# Patient Record
Sex: FEMALE | Race: WHITE
[De-identification: names, ages, dates, MRNs, and addresses within clinical notes are randomized per-mention and may not be internally consistent; named-entity substitution may affect disease eponyms.]

---

## 2018-10-31 ENCOUNTER — HOSPITAL ENCOUNTER (INPATIENT)
Dept: HOSPITAL 31 - C.ER | Age: 28
LOS: 2 days | Discharge: HOME | DRG: 343 | End: 2018-11-02
Attending: SURGERY | Admitting: SURGERY
Payer: COMMERCIAL

## 2018-10-31 VITALS — BODY MASS INDEX: 21.7 KG/M2

## 2018-10-31 DIAGNOSIS — K35.890: Primary | ICD-10-CM

## 2018-10-31 LAB
ALBUMIN SERPL-MCNC: 4.9 [, G/DL] (ref 3.5–5)
ALBUMIN/GLOB SERPL: 1.2 [,] (ref 1–2.1)
ALT SERPL-CCNC: 19 [, U/L] (ref 9–52)
AST SERPL-CCNC: 30 [, U/L] (ref 14–36)
BACTERIA #/AREA URNS HPF: (no result) [,]
BASOPHILS # BLD AUTO: 0.1 [, K/UL] (ref 0–0.2)
BASOPHILS NFR BLD: 0.4 [, %] (ref 0–2)
BASOPHILS NFR BLD: 1 [, %] (ref 0–2)
BILIRUB UR-MCNC: NEGATIVE [,]
BUN SERPL-MCNC: 18 [, MG/DL] (ref 7–17)
CALCIUM SERPL-MCNC: 9.6 [, MG/DL] (ref 8.6–10.4)
EOSINOPHIL # BLD AUTO: 0 [, K/UL] (ref 0–0.7)
EOSINOPHIL NFR BLD: 0 [, %] (ref 0–4)
ERYTHROCYTE [DISTWIDTH] IN BLOOD BY AUTOMATED COUNT: 13.2 [, %] (ref 11.5–14.5)
GFR NON-AFRICAN AMERICAN: > 60 [,]
GLUCOSE UR STRIP-MCNC: NORMAL [, MG/DL]
HCG,QUALITATIVE URINE: NEGATIVE [,]
HGB BLD-MCNC: 14.3 [, G/DL] (ref 11–16)
LEUKOCYTE ESTERASE UR-ACNC: (no result) [, LEU/UL]
LIPASE: 36 [, U/L] (ref 23–300)
LYMPHOCYTE: 4 [, %] (ref 20–40)
LYMPHOCYTES # BLD AUTO: 1 [, K/UL] (ref 1–4.3)
LYMPHOCYTES NFR BLD AUTO: 5 [, %] (ref 20–40)
MCH RBC QN AUTO: 28.3 [, PG] (ref 27–31)
MCHC RBC AUTO-ENTMCNC: 33.4 [, G/DL] (ref 33–37)
MCV RBC AUTO: 84.6 [, FL] (ref 81–99)
MONOCYTE: 1 [, %] (ref 0–10)
MONOCYTES # BLD: 0.3 [, K/UL] (ref 0–0.8)
MONOCYTES NFR BLD: 1.5 [, %] (ref 0–10)
MYELOCYTES NFR BLD: 1 [, %] (ref 0–0)
NEUTROPHILS # BLD: 19.4 [, K/UL] (ref 1.8–7)
NEUTROPHILS NFR BLD AUTO: 92 [, %] (ref 50–75)
NEUTROPHILS NFR BLD AUTO: 93.1 [, %] (ref 50–75)
NEUTS BAND NFR BLD: 1 [, %] (ref 0–2)
NRBC BLD AUTO-RTO: 0 [, %] (ref 0–2)
PH UR STRIP: 9 [,] (ref 5–8)
PLATELET # BLD EST: NORMAL [,]
PLATELET # BLD: 269 [, K/UL] (ref 130–400)
PMV BLD AUTO: 9.9 [, FL] (ref 7.2–11.7)
PROT UR STRIP-MCNC: (no result) [, MG/DL]
RBC # BLD AUTO: 5.07 [, MIL/UL] (ref 3.8–5.2)
RBC # UR STRIP: NEGATIVE [,]
SP GR UR STRIP: 1.03 [,] (ref 1–1.03)
SQUAMOUS EPITHIAL: 5 [, /HPF] (ref 0–5)
TOTAL CELLS COUNTED BLD: 100 [,]
UROBILINOGEN UR-MCNC: NORMAL [, MG/DL] (ref 0.2–1)
WBC # BLD AUTO: 20.9 [, K/UL] (ref 4.8–10.8)

## 2018-10-31 NOTE — RAD
Date of service: 



10/31/2018



PROCEDURE:  Radiographs of the chest and abdomen (obstructive series)



HISTORY:

 abd pain 



COMPARISON:

No prior.



TECHNIQUE:

AP radiograph of the chest, with upright and supine radiographs of 

the abdomen.



FINDINGS:



CHEST:

Lungs: Clear.



Cardiovascular: Normal size heart. No pulmonary vascular 

congestion.No aortic atherosclerotic calcification.



Pleura: No pleural fluid. No pneumothorax.



Other findings: None.



ABDOMEN AND PELVIS:

Bowel: Unremarkable bowel gas pattern. No evidence of mechanical 

obstruction.



Free air: None.



Bones:  Unremarkable.



Other findings: None.



IMPRESSION:

Unremarkable radiographs of chest and abdomen. No evidence of 

mechanical bowel obstruction.

## 2018-10-31 NOTE — CP.PCM.HP
History of Present Illness





- History of Present Illness


History of Present Illness: 





Surgery 





28 F w no sig PMG came w Low abd pain nausea vomiting started this am. Last time

she ate was 1pm today. NOn bloody non bilious. Denies fever, diarrhea, CP, 

recent infection, sickness, illness, sick contact. Pt never had these sx in the 

past. CT shows appendicitis . WBC is 21. Pt consenting for surgery. Understood 

all risks and procedure. Her LMP is mid october. PT is not pregnant. 





PMH none


PSH none


SS lives with 





Present on Admission





- Present on Admission


Any Indicators Present on Admission: Yes





Review of Systems





- Review of Systems


Review of Systems: 





See  HPI 





Past Patient History





- Past Social History


Smoking Status: Light Smoker < 10 Cigarettes Daily





- PSYCHIATRIC


Hx Substance Use: No





- SURGICAL HISTORY


Hx Surgeries: No





- ANESTHESIA


Hx Anesthesia: No





Meds


Allergies/Adverse Reactions: 


                                    Allergies











Allergy/AdvReac Type Severity Reaction Status Date / Time


 


No Known Allergies Allergy   Verified 10/31/18 17:26














Physical Exam





- Constitutional


Appears: No Acute Distress





- Head Exam


Head Exam: ATRAUMATIC, NORMAL INSPECTION, NORMOCEPHALIC





- Eye Exam


Eye Exam: EOMI, Normal appearance, PERRL


Pupil Exam: NORMAL ACCOMODATION, PERRL





- ENT Exam


ENT Exam: Mucous Membranes Moist





- Neck Exam


Neck exam: Positive for: Normal Inspection





- Respiratory Exam


Respiratory Exam: NORMAL BREATHING PATTERN





- Cardiovascular Exam


Cardiovascular Exam: REGULAR RHYTHM





- GI/Abdominal Exam


GI & Abdominal Exam: Distended, Soft, Tenderness.  absent: Firm, Guarding, 

Hernia


Additional comments: 





RLQ abd TTP





-  Exam


 Exam: NORMAL INSPECTION





- Extremities Exam


Extremities exam: Positive for: full ROM, normal inspection





- Back Exam


Back exam: NORMAL INSPECTION





- Neurological Exam


Neurological exam: Alert, CN II-XII Intact, Normal Gait, Oriented x3, Reflexes 

Normal





- Psychiatric Exam


Psychiatric exam: Normal Affect, Normal Mood





- Skin


Skin Exam: Dry, Intact, Normal Color, Warm





Results





- Vital Signs


Recent Vital Signs: 





                                Last Vital Signs











Temp  98.9 F   10/31/18 17:27


 


Pulse  68   10/31/18 17:27


 


Resp  20   10/31/18 17:27


 


BP  109/78   10/31/18 17:27


 


Pulse Ox  100   10/31/18 22:46














- Labs


Result Diagrams: 


                                 10/31/18 17:45





                                 10/31/18 17:45


Labs: 





                         Laboratory Results - last 24 hr











  10/31/18 10/31/18 10/31/18





  17:45 17:45 18:02


 


WBC  20.9 H  


 


RBC  5.07  


 


Hgb  14.3  


 


Hct  42.9  


 


MCV  84.6  


 


MCH  28.3  


 


MCHC  33.4  


 


RDW  13.2  


 


Plt Count  269  


 


MPV  9.9  


 


Neut % (Auto)  93.1 H  


 


Lymph % (Auto)  5.0 L  


 


Mono % (Auto)  1.5  


 


Eos % (Auto)  0.0  


 


Baso % (Auto)  0.4  


 


Neut # (Auto)  19.4 H  


 


Lymph # (Auto)  1.0  


 


Mono # (Auto)  0.3  


 


Eos # (Auto)  0.0  


 


Baso # (Auto)  0.1  


 


Neutrophils % (Manual)  92 H  


 


Band Neutrophils %  1  


 


Lymphocytes % (Manual)  4 L  


 


Monocytes % (Manual)  1  


 


Basophils % (Manual)  1  


 


Myelocytes %  1 H  


 


Platelet Estimate  Normal  


 


Sodium   138 


 


Potassium   4.4 


 


Chloride   100 


 


Carbon Dioxide   23 


 


Anion Gap   20 


 


BUN   18 H 


 


Creatinine   0.7 


 


Est GFR ( Amer)   > 60 


 


Est GFR (Non-Af Amer)   > 60 


 


Random Glucose   135 H 


 


Calcium   9.6 


 


Total Bilirubin   0.7 


 


AST   30 


 


ALT   19 


 


Alkaline Phosphatase   67 


 


Total Protein   9.0 H 


 


Albumin   4.9 


 


Globulin   4.1 H 


 


Albumin/Globulin Ratio   1.2 


 


Lipase   36 


 


Urine Color    Yellow


 


Urine Clarity    Clear


 


Urine pH    9.0


 


Ur Specific Gravity    1.030


 


Urine Protein    2+ H


 


Urine Glucose (UA)    Normal


 


Urine Ketones    2+ H


 


Urine Blood    Negative


 


Urine Nitrate    Negative


 


Urine Bilirubin    Negative


 


Urine Urobilinogen    Normal


 


Ur Leukocyte Esterase    Neg


 


Urine WBC (Auto)    < 1


 


Urine RBC (Auto)    3


 


Ur Squamous Epith Cells    5


 


Urine Bacteria    Rare


 


Urine HCG, Qual    Negative














Assessment & Plan





- Assessment and Plan (Free Text)


Assessment: 





Appendicitis





-NPO 


-IVF


-ABX


-OR Thurs





DW Dr. Mcmanus

## 2018-10-31 NOTE — C.PDOC
History Of Present Illness





28 year old female with no past medical history presents to the ED for 

evaluation of nausea, vomiting, and abdominal pain since 10am this morning. 

Patient describes the abdominal pain as sharp and diffuse, worse over the lower 

abdomen. Notes last menstrual period was Oct 17, not currently on birth control.

Denies changes in urine, diarrhea, fever, and any other associated symptoms. 





Time Seen by Provider: 10/31/18 17:31


Chief Complaint (Nursing): Abdominal Pain


History Per: Patient


History/Exam Limitations: no limitations


Onset/Duration Of Symptoms: Hrs


Current Symptoms Are (Timing): Still Present





Past Medical History


Reviewed: Historical Data, Nursing Documentation, Vital Signs


Vital Signs: 





                                Last Vital Signs











Temp  98.9 F   10/31/18 17:27


 


Pulse  68   10/31/18 17:27


 


Resp  20   10/31/18 17:27


 


BP  109/78   10/31/18 17:27


 


Pulse Ox  100   10/31/18 17:27











Family History: States: Unknown Family Hx





- Social History


Hx Alcohol Use: Yes


Hx Substance Use: No





- Immunization History


Hx Tetanus Toxoid Vaccination: No


Hx Influenza Vaccination: No


Hx Pneumococcal Vaccination: No





Review Of Systems


Constitutional: Negative for: Fever, Chills


Gastrointestinal: Positive for: Nausea, Vomiting, Abdominal Pain


Genitourinary: Negative for: Other (urine changes.)





Physical Exam





- Physical Exam


Appears: Non-toxic, Other (uncomfortable. spitting saliva into a bag.)


Skin: Normal Color, Warm, Dry


Head: Atraumatic, Normacephalic


Eye(s): bilateral: Normal Inspection


Oral Mucosa: Moist


Neck: Normal ROM, Supple


Chest: Symmetrical, No Deformity


Cardiovascular: Rhythm Regular, No Murmur


Respiratory: Normal Breath Sounds, No Rales, No Rhonchi, No Wheezing


Gastrointestinal/Abdominal: Normal Exam, Bowel Sounds (quiet.), Tenderness (over

the lower quadrant. ), Distention (mild diffuse distended abdomen.)


Neurological/Psych: Oriented x3, Normal Speech





ED Course And Treatment





- Laboratory Results


Result Diagrams: 


                                 10/31/18 17:45





                                 10/31/18 17:45


Lab Interpretation: Abnormal (WBC 20.9 with marked left shift)


O2 Sat by Pulse Oximetry: 100 (RA)


Pulse Ox Interpretation: Normal





- CT Scan/US


  ** CT abd/pelvis


Other Rad Studies (CT/US): Read By Radiologist, Radiology Report Reviewed


CT/US Interpretation: EXAM:  CT Abdomen and Pelvis with IV contrast.  CLINICAL 

HISTORY:  MID ABD  PAIN. VOMITING.  TECHNIQUE:  Axial computed tomography images

of the abdomen and pelvis with intravenous contrast.  CONTRAST:  With 

intravenous contrast.  COMPARISON:  None provided.  FINDINGS:  LUNG BASES:  The 

lung bases appear clear. No pleural effusions are seen.  LIVER:  Unremarkable.  

GALLBLADDER AND BILE DUCTS:  The gallbladder appears within normal limits. No 

radioopaque gallstones are seen. No biliary ductal dilatation is evident.  

PANCREAS:  Unremarkable.  SPLEEN:  Unremarkable.  ADRENAL GLANDS:  Unremarkable.

 KIDNEYS, URETERS, AND BLADDER:  The kidneys appear within normal limits. There 

is no hydronephrosis or hydroureter. No urinary calculi are seen.  STOMACH AND 

BOWEL:  Unremarkable appearance of the stomach and bowel. No evidence of bowel 

obstruction. No evidence suggesting enteritis or colitis.  APPENDIX:  Findings 

consistent with acute appendicitis.  PERITONEUM:  No free fluid. No free air.  

LYMPH NODES:  No lymphadenopathy is evident.  REPRODUCTIVE:  Unremarkable as vis

ualized.  VASCULATURE:  No evidence of abdominal aortic aneurysm.  BONES:  No 

aggressive appearing osseous lesion. No acute osseous pathology evident.  

IMPRESSION:  Findings consistent with acute appendicitis. No abscess or rupture.

 .  Electronically signed on Oct 31, 2018 9:52:25 PM EDT by:  Gregg Funk M.D.,

[facility] Certified By [facility] [facility].  Fellowship Trained MRI and CT Specialist


Reevaluation Time: 22:00


Reassessment Condition: Improved





- Physician Consult Information


Time Consulting Physician Contacted: 22:01


Physician Contacted: Wicho Mcmanus





Medical Decision Making


Medical Decision Making: 





Plan: 


-Obstructive Series X-ray


-CT ABD/Pelvis PO & IV Contrast


-Blood sent. 


-Urine HCG 


-Urinalysis 


-Zofran








Disposition





- Disposition


Disposition: HOSPITALIZED


Disposition Time: 22:01


Condition: STABLE





- POA


Present On Arrival: None





- Clinical Impression


Clinical Impression: 


 Acute appendicitis








- Scribe Statement


The provider has reviewed the documentation as recorded by the Scribe (Lucy Alas)


Provider Attestation: 





All medical record entries made by the Scribe were at my direction and 

personally dictated by me. I have reviewed the chart and agree that the record 

accurately reflects my personal performance of the history, physical exam, 

medical decision making, and the department course for this patient. I have also

personally directed, reviewed, and agree with the discharge instructions and 

disposition.

## 2018-11-01 LAB
ALBUMIN SERPL-MCNC: 3.3 [, G/DL] (ref 3.5–5)
ALBUMIN/GLOB SERPL: 1.2 [,] (ref 1–2.1)
ALT SERPL-CCNC: 23 [, U/L] (ref 9–52)
APTT BLD: 31 [, SECONDS] (ref 21–34)
AST SERPL-CCNC: 13 [, U/L] (ref 14–36)
BASOPHILS # BLD AUTO: 0.1 [, K/UL] (ref 0–0.2)
BASOPHILS NFR BLD: 0.5 [, %] (ref 0–2)
BUN SERPL-MCNC: 13 [, MG/DL] (ref 7–17)
CALCIUM SERPL-MCNC: 8 [, MG/DL] (ref 8.6–10.4)
EOSINOPHIL # BLD AUTO: 0.1 [, K/UL] (ref 0–0.7)
EOSINOPHIL NFR BLD: 0.6 [, %] (ref 0–4)
ERYTHROCYTE [DISTWIDTH] IN BLOOD BY AUTOMATED COUNT: 12.9 [, %] (ref 11.5–14.5)
GFR NON-AFRICAN AMERICAN: > 60 [,]
HGB BLD-MCNC: 12.4 [, G/DL] (ref 11–16)
INR PPP: 1.1 [,]
LYMPHOCYTES # BLD AUTO: 2.9 [, K/UL] (ref 1–4.3)
LYMPHOCYTES NFR BLD AUTO: 20.9 [, %] (ref 20–40)
MCH RBC QN AUTO: 28.4 [, PG] (ref 27–31)
MCHC RBC AUTO-ENTMCNC: 33.4 [, G/DL] (ref 33–37)
MCV RBC AUTO: 85 [, FL] (ref 81–99)
MONOCYTES # BLD: 1 [, K/UL] (ref 0–0.8)
MONOCYTES NFR BLD: 7.4 [, %] (ref 0–10)
NEUTROPHILS # BLD: 9.9 [, K/UL] (ref 1.8–7)
NEUTROPHILS NFR BLD AUTO: 70.6 [, %] (ref 50–75)
NRBC BLD AUTO-RTO: 0 [, %] (ref 0–2)
PLATELET # BLD: 212 [, K/UL] (ref 130–400)
PMV BLD AUTO: 9.9 [, FL] (ref 7.2–11.7)
PROTHROMBIN TIME: 12.4 [, SECONDS] (ref 9.7–12.2)
RBC # BLD AUTO: 4.37 [, MIL/UL] (ref 3.8–5.2)
WBC # BLD AUTO: 14 [, K/UL] (ref 4.8–10.8)

## 2018-11-01 PROCEDURE — 0DTJ4ZZ RESECTION OF APPENDIX, PERCUTANEOUS ENDOSCOPIC APPROACH: ICD-10-PCS | Performed by: SURGERY

## 2018-11-01 RX ADMIN — SODIUM CHLORIDE SCH MLS/HR: 9 INJECTION, SOLUTION INTRAVENOUS at 04:37

## 2018-11-01 RX ADMIN — OXYCODONE HYDROCHLORIDE AND ACETAMINOPHEN PRN TAB: 5; 325 TABLET ORAL at 17:11

## 2018-11-01 RX ADMIN — OXYCODONE HYDROCHLORIDE AND ACETAMINOPHEN PRN TAB: 5; 325 TABLET ORAL at 22:03

## 2018-11-01 RX ADMIN — SODIUM CHLORIDE SCH MLS/HR: 9 INJECTION, SOLUTION INTRAVENOUS at 22:00

## 2018-11-01 RX ADMIN — SODIUM CHLORIDE SCH: 9 INJECTION, SOLUTION INTRAVENOUS at 01:28

## 2018-11-01 RX ADMIN — SODIUM CHLORIDE SCH MLS/HR: 9 INJECTION, SOLUTION INTRAVENOUS at 10:37

## 2018-11-01 RX ADMIN — SODIUM CHLORIDE SCH MLS/HR: 9 INJECTION, SOLUTION INTRAVENOUS at 17:11

## 2018-11-01 NOTE — PCM.SURG1
Surgeon's Initial Post Op Note





- Surgeon's Notes


Surgeon: Dr. Mcmanus


Assistant: Dr. Lara PGY-3


Type of Anesthesia: General Endo


Pre-Operative Diagnosis: Acute appendicitis


Operative Findings: See operative report


Post-Operative Diagnosis: Same


Operation Performed: Laparoscopic Appendectomy


Specimen/Specimens Removed: Appendix


Estimated Blood Loss: EBL {In ML}: 10


Blood Products Given: N/A


Drains Used: No Drains


Post-Op Condition: Good


Date of Surgery/Procedure: 11/01/18


Time of Surgery/Procedure: 15:18

## 2018-11-01 NOTE — CT
PROCEDURE:  CT Abdomen and Pelvis with oral and  IV contrast.



HISTORY:

abdominal pain



COMPARISON:

None available



TECHNIQUE:

Contiguous axial images of the abdomen and pelvis. Oral and IV 

contrast was administered. Coronal and Sagittal reformats generated 

and reviewed. 



Contrast dose: 150 mL Visipaque 320



Radiation dose:



Total exam DLP = 295.93 mGy-cm.



This CT exam was performed using one or more of the following dose 

reduction techniques: Automated exposure control, adjustment of the 

mA and/or kV according to patient size, and/or use of iterative 

reconstruction technique.



FINDINGS:





LOWER THORAX:

No visible consolidation, pleural effusion, or pneumothorax.



Small hiatal hernia/distal esophageal wall thickening. 



LIVER:

Unremarkable. 



GALLBLADDER AND BILE DUCTS:

Unremarkable. 



PANCREAS:

Unremarkable. 



SPLEEN:

Unremarkable. 



ADRENALS:

Unremarkable. 



KIDNEYS AND URETERS:

The kidneys enhance symmetrically. No hydronephrosis or obstructing 

renal calculus. 



BLADDER:

The urinary bladder appears unremarkable.



REPRODUCTIVE:

Uterus is present. 



APPENDIX:

Thickened appearance of the appendix measuring approximately 1.1 cm 

in diameter the pulmonary some similar acute appendicitis. Adjacent 

sub cm pericecal lymph nodes. 



BOWEL:

The stomach is nondistended. 



The bowel loops appear within normal limits of caliber without 

evidence of intestinal obstruction.



PERITONEUM:

No significant free fluid. No definite free air.



LYMPH NODES:

No bulky lymphadenopathy identified.



VASCULATURE:

No aortic aneurysm. 



BONES:

No acute osseous abnormality is detected.



OTHER FINDINGS:

None. 



IMPRESSION:

Thickened appearance of the appendix measuring approximately 1.1 cm 

in diameter the pulmonary some similar acute appendicitis. Adjacent 

sub cm pericecal lymph nodes. 



Preliminary impression was provided by [facility]. Findings/results 

discussed by Dr. Yung with Dr. Silva on 10/31/18 at 958pm.

## 2018-11-02 VITALS
SYSTOLIC BLOOD PRESSURE: 107 MMHG | DIASTOLIC BLOOD PRESSURE: 66 MMHG | OXYGEN SATURATION: 95 % | TEMPERATURE: 98.8 F | HEART RATE: 74 BPM

## 2018-11-02 VITALS — RESPIRATION RATE: 20 BRPM

## 2018-11-02 LAB
ERYTHROCYTE [DISTWIDTH] IN BLOOD BY AUTOMATED COUNT: 13.1 [, %] (ref 11.5–14.5)
HGB BLD-MCNC: 11 [, G/DL] (ref 11–16)
MCH RBC QN AUTO: 28.6 [, PG] (ref 27–31)
MCHC RBC AUTO-ENTMCNC: 33.5 [, G/DL] (ref 33–37)
MCV RBC AUTO: 85.3 [, FL] (ref 81–99)
PLATELET # BLD: 185 [, K/UL] (ref 130–400)
PMV BLD AUTO: 9.8 [, FL] (ref 7.2–11.7)
RBC # BLD AUTO: 3.85 [, MIL/UL] (ref 3.8–5.2)
WBC # BLD AUTO: 11.6 [, K/UL] (ref 4.8–10.8)

## 2018-11-02 RX ADMIN — OXYCODONE HYDROCHLORIDE AND ACETAMINOPHEN PRN TAB: 5; 325 TABLET ORAL at 10:54

## 2018-11-02 RX ADMIN — SODIUM CHLORIDE SCH MLS/HR: 9 INJECTION, SOLUTION INTRAVENOUS at 04:35

## 2018-11-02 RX ADMIN — OXYCODONE HYDROCHLORIDE AND ACETAMINOPHEN PRN TAB: 5; 325 TABLET ORAL at 04:35

## 2018-11-07 NOTE — OP
PROCEDURE DATE:  11/01/2018



PREOPERATIVE DIAGNOSIS:  Acute appendicitis.



POSTOPERATIVE DIAGNOSIS:  Acute appendicitis.



PROCEDURE PERFORMED:  Laparoscopic appendectomy.



FINDINGS:  The appendix was markedly edematous, which was slightly

retrocecal.  There was no gross perforation noted.  No fluid in the

peritoneal cavity.



DESCRIPTION OF PROCEDURE:  Under general anesthesia, the patient was

prepared and draped in the usual sterile fashion.  CO2 was insufflated to

about 15 mmHg pressure.  An 11-mm trocar was inserted in the umbilicus, a

5-mm trocar in the suprapubic area and a 5-mm trocar in the left lower

quadrant.  The patient was placed in a Trendelenburg position and was

turned over towards the left side.  The appendix was identified.  It was

dissected free from all the surrounding adhesions.  The mesoappendix was

transected with the aid of the Auto[employer] model Endo VIPIN with white load. 

Then, the base of the appendix was _____.  The appendix initially was

placed in an Endo Catch and was extracted through the umbilical port.  CO2

was allowed to escape from the peritoneal cavity.  The trocars were

removed.  The wound was closed in a routine fashion.  Estimated blood loss

about 10 mL.  No complications.







__________________________________________

Wicho Mcmanus MD





DD:  11/06/2018 17:42:52

DT:  11/07/2018 2:13:06

Job # [id number]